# Patient Record
Sex: FEMALE | Race: WHITE | NOT HISPANIC OR LATINO | URBAN - METROPOLITAN AREA
[De-identification: names, ages, dates, MRNs, and addresses within clinical notes are randomized per-mention and may not be internally consistent; named-entity substitution may affect disease eponyms.]

---

## 2021-01-01 ENCOUNTER — EMERGENCY (EMERGENCY)
Facility: HOSPITAL | Age: 86
LOS: 1 days | End: 2021-01-01
Attending: EMERGENCY MEDICINE
Payer: MEDICARE

## 2021-01-01 PROCEDURE — 99285 EMERGENCY DEPT VISIT HI MDM: CPT | Mod: 25

## 2021-01-01 PROCEDURE — 92950 HEART/LUNG RESUSCITATION CPR: CPT

## 2021-01-01 PROCEDURE — 31500 INSERT EMERGENCY AIRWAY: CPT

## 2021-01-01 PROCEDURE — 36680 INSERT NEEDLE BONE CAVITY: CPT

## 2021-01-01 PROCEDURE — 31500 INSERT EMERGENCY AIRWAY: CPT | Mod: 59

## 2021-11-26 NOTE — ED PROVIDER NOTE - PROGRESS NOTE DETAILS
Robert Cárdenas D.O., PGY3 (Resident)  discussed with ME Melania. Not a ME case. Discussed w live on. Aware of patient.

## 2021-11-26 NOTE — ED PROVIDER NOTE - PHYSICAL EXAMINATION
GENERAL: elderly female, altered, no pulse > CPR.   EYES: Conjunctiva noninjected or pale, sclera anicteric  HENT: NC/AT, moist mucous membranes, no teeth; large volume emesis from airway  NECK: Supple, trachea midline  LUNG: no spont respirations, no wheeze  CV: no pulse  ABDOMEN: Nondistended  MSK: No visible deformities, + LUE swelling  SKIN: No rashes, bruises  NEURO: no pulse, not awake, not alert

## 2021-11-26 NOTE — ED PROCEDURE NOTE - PROCEDURE ADDITIONAL DETAILS
Robert Cárdenas D.O., PGY3 (Resident)  IO placed in right and left prox tibia for emergent access
Robert Cárdenas D.O., PGY3 (Resident)  Size 7.5 cuffed endotracheal tube (ETT) was advanced past the vocal cords into the trachea while under direct visualization. ETT was secured at 23cm at the lip and was secured to the patient. No end-tidal capnography was obtained but tube confirmed with VL. Bilateral breath sounds were heard. Tube placement was confirmed with CXR. Tube complicated by crash intubation  and large volume emesis in oropharynx. Extensive suctioning

## 2021-11-26 NOTE — ED PROVIDER NOTE - CLINICAL SUMMARY MEDICAL DECISION MAKING FREE TEXT BOX
89F hx of melanoma, long standing hypertension presents to the ED for AMS, found to be in cardiopulmonary arrest. CPR started, meds per code sheet. Intubated with 7.5 eTT, large volume emesis in oropharynx. Tube confirmed with glide but not end tidal. Family brought into room, no pulse on repeat pulse check. Decision to terminate CPR. TOD 14:08

## 2021-11-26 NOTE — ED ADULT NURSE NOTE - NSIMPLEMENTINTERV_GEN_ALL_ED
Implemented All Fall with Harm Risk Interventions:  [place] to call system. Call bell, personal items and telephone within reach. Instruct patient to call for assistance. Room bathroom lighting operational. Non-slip footwear when patient is off stretcher. Physically safe environment: no spills, clutter or unnecessary equipment. Stretcher in lowest position, wheels locked, appropriate side rails in place. Provide visual cue, wrist band, yellow gown, etc. Monitor gait and stability. Monitor for mental status changes and reorient to person, place, and time. Review medications for side effects contributing to fall risk. Reinforce activity limits and safety measures with patient and family. Provide visual clues: red socks.

## 2021-11-26 NOTE — ED PROVIDER NOTE - ATTENDING CONTRIBUTION TO CARE
89yr F w hx of HTN brought in by EMS , for progresive fatigue and malaise at home and AMS tody. daughter called EMS. en route, EMS unable to wake her up and unable to have a reliable BP read. on arrival pt appears to have agonal breathing, but had faint carotid pulse. immediately prepared for tracheal intubation. however shortly after, patient became bradycardic to 30s and no pulse was palpated so CPR was initiated, after confirming twice with paramedics that patient is "full code". paramedics reported they spoke with daughter and she said she wants her to be resuscitated.   tracheal intubation was done without RSI without interruption of CPR. confirmed the placement of the tube, however despite inflating the balloon of ETT, pt has audible airleak, but chest rise and pulse ox w CPR was reassuring. reconfirmed with video endotracheal placement. of note, pt has massive stomach contents on first attempt in the airway which was aspirated and suctioned as much as possible. pt received epi after confirming a non shockable rhythm on first pulse check as well as Ca and bicarb. on each pulse check pt had either PEA or asystole. initial attempt to gain IV access were unsuccessful, so IOs were placed by me x2. shortly after the daughter's arrival and rule out reversible causes of cardiac arrest, the daughter reported that she would not wanted her to suffer and asked for CPR attempts to be stopped, which was done after first pulse check. the daughter was counselled and rabbi was provided. death paper work was completed, TOD 14:08

## 2021-11-26 NOTE — CHART NOTE - NSCHARTNOTEFT_GEN_A_CORE
EMERGENCY : SW consulted by medical team to provide support to patient's daughter. As per medical team patient in cardiac arrest. LMSW met with patient's daughter in private family waiting area, patient's daughter requesting to speak with a physician. Patient's daughter is Brittney Tierney PH: [phone]. At this time Attending MD met with patient's daughter to discuss wishes. LMSW then made aware that patient's daughter would like to be present in room when CPR is stopped. LMSW then accompanied patient's daughter to bedside where TOD was called by physicians.   LMSW provided continuous support to patient's daughter. Patient's daughter tearful but appears to be coping appropriately at this time. She shared that patient is of Zoroastrianism osbaldo and requests a Rabbi at bedside. LMSW contacted chaplaincy department and spoke with Rabbi Garcia who states he will be at bedside shortly. Patient's daughter then met with Rabbi Garcia. LMSW follow up with patient's daughter who endorses feeling much better after speaking with the . LMSW continued to provide bereavement counseling and supportive counseling to which she was receptive. Patient's daughter with no further questions or concerns for LMSW at present. Contact info provided. Ongoing SW availability ensured. SW continues to remain available for any needs.

## 2021-11-26 NOTE — ED PROVIDER NOTE - OBJECTIVE STATEMENT
89F hx of HTN, malignant melanoma, Chronic pain of right ankle presents to the ED with AMS. No other hx known or obtainable.     WALT Garcia [phone]

## 2021-11-26 NOTE — ED ADULT NURSE NOTE - OBJECTIVE STATEMENT
89 yo F presents to ED via EMS for AMS. Patient brought from EMS stretcher to [facility]. MD and RN at bedside, patient placed on cardiac monitor and defibrillator pads.  Patient found to be asystole, CPR started. Patient intubated, 7.5 ETT. Respiratory at bedside. See cardiac arrest flow sheet for medications administered. Daughter accompanied by  to bedside. MAGDA called [phone].

## 2023-09-10 NOTE — ED ADULT TRIAGE NOTE - NS ED NURSE BANDS TYPE
Name band;

## 2025-04-18 NOTE — ED ADULT NURSE NOTE - EXTENSIONS OF SELF_ADULT
[facility] provides services at a reduced cost to those who are determined to be eligible through [facility]’s financial assistance program. Information regarding [facility]’s financial assistance program can be found by going to [email or web address] or by calling [phone].
None